# Patient Record
Sex: MALE | Race: WHITE | NOT HISPANIC OR LATINO | URBAN - METROPOLITAN AREA
[De-identification: names, ages, dates, MRNs, and addresses within clinical notes are randomized per-mention and may not be internally consistent; named-entity substitution may affect disease eponyms.]

---

## 2021-08-28 ENCOUNTER — TELEPHONE ENCOUNTER (OUTPATIENT)
Dept: URBAN - METROPOLITAN AREA CLINIC 13 | Facility: CLINIC | Age: 56
End: 2021-08-28

## 2021-08-29 ENCOUNTER — TELEPHONE ENCOUNTER (OUTPATIENT)
Dept: URBAN - METROPOLITAN AREA CLINIC 13 | Facility: CLINIC | Age: 56
End: 2021-08-29

## 2023-07-19 ENCOUNTER — DASHBOARD ENCOUNTERS (OUTPATIENT)
Age: 58
End: 2023-07-19

## 2023-07-19 ENCOUNTER — OFFICE VISIT (OUTPATIENT)
Dept: URBAN - METROPOLITAN AREA CLINIC 118 | Facility: CLINIC | Age: 58
End: 2023-07-19
Payer: COMMERCIAL

## 2023-07-19 VITALS
WEIGHT: 186 LBS | HEIGHT: 67 IN | DIASTOLIC BLOOD PRESSURE: 77 MMHG | SYSTOLIC BLOOD PRESSURE: 138 MMHG | TEMPERATURE: 97.7 F | BODY MASS INDEX: 29.19 KG/M2 | HEART RATE: 74 BPM

## 2023-07-19 DIAGNOSIS — K64.1 BLEEDING GRADE II HEMORRHOIDS: ICD-10-CM

## 2023-07-19 DIAGNOSIS — K64.2 BLEEDING GRADE III HEMORRHOIDS: ICD-10-CM

## 2023-07-19 PROCEDURE — 46221 LIGATION OF HEMORRHOID(S): CPT | Performed by: INTERNAL MEDICINE

## 2023-07-19 PROCEDURE — 99203 OFFICE O/P NEW LOW 30 MIN: CPT | Performed by: INTERNAL MEDICINE

## 2023-07-19 RX ORDER — ATORVASTATIN CALCIUM 20 MG/1
TABLET, FILM COATED ORAL
Qty: 0 | Refills: 0 | Status: ACTIVE | COMMUNITY
Start: 1900-01-01

## 2023-07-19 RX ORDER — LEVOTHYROXINE SODIUM 0.1 MG/1
TABLET ORAL
Qty: 0 | Refills: 0 | Status: ACTIVE | COMMUNITY
Start: 1900-01-01

## 2023-07-19 RX ORDER — SUCRALFATE 1 G/1
TAKE ONE TABLET PO TID, BREAK APART THE TABLET AND DISSOLVE IN A GLASS OF WATER PRIOR AND DRINK. NO REFILLS TABLET ORAL 2
Qty: 90 | Refills: 0 | Status: ACTIVE | COMMUNITY
Start: 2019-12-18

## 2023-07-19 NOTE — HPI-TODAY'S VISIT:
patient reports bleeding and some swelling and rare leakage from her hemorrhoids for a long time last colon 2017, hemorrhoids, no polyps, repeat 2027

## 2023-07-19 NOTE — EXAM-PHYSICAL EXAM
Lizebth in room as chaperone Rectal exam: non thormbosed external hemorrhoid with prolapsing internal hemorrhoid  Anoscopy performed using self lighted anoscope Grade 3 RA, Grade 3 large inflammed RP, Grade 2 LL  Banded RP, RA, LL  using [facility] [employer] system without complication after reviewing R/B/A with the patient He had discomfort anorectal but he had a lot of anxiety and was having anal spasm with the procedure no abd pain/cramping at the end of the procedure